# Patient Record
(demographics unavailable — no encounter records)

---

## 2024-11-05 NOTE — PHYSICAL EXAM
[No Acute Distress] : no acute distress [Normal Venous Pressure] : normal venous pressure [Normal S1, S2] : normal S1, S2 [No Murmur] : no murmur [No Rub] : no rub [No Gallop] : no gallop [Clear Lung Fields] : clear lung fields [Good Air Entry] : good air entry [Soft] : abdomen soft [Non Tender] : non-tender

## 2024-11-08 NOTE — ASSESSMENT
[FreeTextEntry1] : 58 year old woman, post-menopausal with h/o HLD who presents with poorly controlled LDL and statin intolerance due to myalgias.  on ezetimibe for 3 months. Likely heterozygous FH, no xanthomas or obvious stigmata. Asymptomatic with good exertional tolerance. Lpa elevated 325, repeat  on Nexlizet.  - Discussed PCSK9 for LDL and Lpa reduction however patient would like to try statin for now to avoid injection - Continue bempedoic acid-ezetimibe 180-10 mg daily - Start pravastatin 20 mg daily - EKG with low voltage, obtain TTE  - Counseled on dietary and exercise modifications

## 2024-11-08 NOTE — REASON FOR VISIT
[FreeTextEntry1] : 58 year old woman h/o HLD who presents to [facility] care. Patient denies any resting or exertional symptoms, goes to gym 5 times a week with rower, weights and treadmill and walks 3 miles a day. She was previous on multiple statins (atorvastatin, rosuvastatin, simvastatin), however was unable to tolerate due to myalgias. Recently was placed on Zetia which she has been taking for 3 months. She was told at one point that she may have NAFLD.  not on statin. After 2 months of Zetia,  and . Menopause at 50 years old.   Since last visit, she has been taking Nexlizet without issues. No new symptoms, continues to exercise 5 times a week though dietary changes has been difficult to implement.    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

## 2024-11-08 NOTE — REASON FOR VISIT
[FreeTextEntry1] : 58 year old woman h/o HLD who presents to [facility] care. Patient denies any resting or exertional symptoms, goes to gym 5 times a week with rower, weights and treadmill and walks 3 miles a day. She was previous on multiple statins (atorvastatin, rosuvastatin, simvastatin), however was unable to tolerate due to myalgias. Recently was placed on Zetia which she has been taking for 3 months. She was told at one point that she may have NAFLD.  not on statin. After 2 months of Zetia,  and . Menopause at 50 years old.   Since last visit, she has been taking Nexlizet without issues. No new symptoms, continues to exercise 5 times a week though dietary changes has been difficult to implement.    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

## 2024-11-08 NOTE — REASON FOR VISIT
[FreeTextEntry1] : 58 year old woman h/o HLD who presents to [facility] care. Patient denies any resting or exertional symptoms, goes to gym 5 times a week with rower, weights and treadmill and walks 3 miles a day. She was previous on multiple statins (atorvastatin, rosuvastatin, simvastatin), however was unable to tolerate due to myalgias. Recently was placed on Zetia which she has been taking for 3 months. She was told at one point that she may have NAFLD.  not on statin. After 2 months of Zetia,  and . Menopause at 50 years old.   Since last visit, she has been taking Nexlizet without issues. No new symptoms, continues to exercise 5 times a week though dietary changes has been difficult to implement.    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

## 2024-11-08 NOTE — REASON FOR VISIT
[FreeTextEntry1] : 58 year old woman h/o HLD who presents to [facility] care. Patient denies any resting or exertional symptoms, goes to gym 5 times a week with rower, weights and treadmill and walks 3 miles a day. She was previous on multiple statins (atorvastatin, rosuvastatin, simvastatin), however was unable to tolerate due to myalgias. Recently was placed on Zetia which she has been taking for 3 months. She was told at one point that she may have NAFLD.  not on statin. After 2 months of Zetia,  and . Menopause at 50 years old.   Since last visit, she has been taking Nexlizet without issues. No new symptoms, continues to exercise 5 times a week though dietary changes has been difficult to implement.    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

## 2025-04-17 NOTE — ASSESSMENT
[FreeTextEntry1] : 58 year old woman, post-menopausal with h/o HLD who presents with poorly controlled LDL and statin intolerance due to myalgias.  on ezetimibe for 3 months. Likely heterozygous FH, no xanthomas or obvious stigmata. Asymptomatic with good exertional tolerance. Lpa elevated 325, repeat  on Nexlizet. Started on pravastatin after last visit, taking daily without issue.  - Discussed PCSK9 for LDL and Lpa reduction however patient would like to try statin for now to avoid injection - Continue bempedoic acid-ezetimibe 180-10 mg daily - Continue pravastatin 20 mg daily, fasting lipids today, will up titrate pending results - EKG with low voltage, obtain TTE  - Counseled on dietary and exercise modifications

## 2025-04-17 NOTE — REASON FOR VISIT
[FreeTextEntry1] : 58 year old woman h/o HLD who presents to [facility] care. Patient denies any resting or exertional symptoms, goes to gym 5 times a week with rower, weights and treadmill and walks 3 miles a day. She was previous on multiple statins (atorvastatin, rosuvastatin, simvastatin), however was unable to tolerate due to myalgias. Recently was placed on Zetia which she has been taking for 3 months. She was told at one point that she may have NAFLD.  not on statin. After 2 months of Zetia,  and . Menopause at 50 years old.   Since last visit, she has been taking Nexlizet and pravastatin daily without issues. No new symptoms, continues to exercise 5 times a week though dietary changes has been difficult to implement.    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

## 2025-04-17 NOTE — REASON FOR VISIT
[FreeTextEntry1] : 58 year old woman h/o HLD who presents to [facility] care. Patient denies any resting or exertional symptoms, goes to gym 5 times a week with rower, weights and treadmill and walks 3 miles a day. She was previous on multiple statins (atorvastatin, rosuvastatin, simvastatin), however was unable to tolerate due to myalgias. Recently was placed on Zetia which she has been taking for 3 months. She was told at one point that she may have NAFLD.  not on statin. After 2 months of Zetia,  and . Menopause at 50 years old.   Since last visit, she has been taking Nexlizet and pravastatin daily without issues. No new symptoms, continues to exercise 5 times a week though dietary changes has been difficult to implement.    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